# Patient Record
Sex: FEMALE | Race: WHITE | NOT HISPANIC OR LATINO
[De-identification: names, ages, dates, MRNs, and addresses within clinical notes are randomized per-mention and may not be internally consistent; named-entity substitution may affect disease eponyms.]

---

## 2021-12-07 ENCOUNTER — OFFICE VISIT (OUTPATIENT)
Age: 51
End: 2021-12-07

## 2021-12-20 ENCOUNTER — OFFICE VISIT (OUTPATIENT)
Dept: URBAN - METROPOLITAN AREA CLINIC 7 | Facility: CLINIC | Age: 51
End: 2021-12-20

## 2022-01-05 ENCOUNTER — TELEPHONE ENCOUNTER (OUTPATIENT)
Dept: URBAN - METROPOLITAN AREA CLINIC 9 | Facility: CLINIC | Age: 52
End: 2022-01-05

## 2022-01-07 ENCOUNTER — LAB OUTSIDE AN ENCOUNTER (OUTPATIENT)
Age: 52
End: 2022-01-07

## 2022-01-08 LAB
CULTURE: (no result)
EIA: (no result)

## 2022-01-12 LAB — CONCENTRATE (1): (no result)

## 2022-01-13 ENCOUNTER — TELEPHONE ENCOUNTER (OUTPATIENT)
Dept: URBAN - METROPOLITAN AREA CLINIC 9 | Facility: CLINIC | Age: 52
End: 2022-01-13

## 2022-01-13 LAB
CALPROTECTIN, STOOL: (no result)
CAMPYLOBACTER SPP. AG,EIA: (no result)
CLOSTRIDIUM DIFFICILE TOXINB,QL REAL TIME PCR: NOT DETECTED
CYCLOSPORA AND ISOSPORA EXAMINATION: (no result)
OVA AND PARASITES, CONC AND PERM SMEAR: (no result)
PANCREATIC ELASTASE-1: (no result)
SALMONELLA AND SHIGELLA, CULTURE: (no result)
SHIGA TOXINS, EIA W/RFL TO E.COLI O157 CULTURE: (no result)

## 2022-01-14 ENCOUNTER — TELEPHONE ENCOUNTER (OUTPATIENT)
Dept: URBAN - METROPOLITAN AREA CLINIC 9 | Facility: CLINIC | Age: 52
End: 2022-01-14

## 2022-01-21 ENCOUNTER — TELEPHONE ENCOUNTER (OUTPATIENT)
Dept: URBAN - METROPOLITAN AREA CLINIC 9 | Facility: CLINIC | Age: 52
End: 2022-01-21

## 2022-02-16 ENCOUNTER — TELEPHONE ENCOUNTER (OUTPATIENT)
Dept: URBAN - METROPOLITAN AREA CLINIC 9 | Facility: CLINIC | Age: 52
End: 2022-02-16

## 2022-02-17 ENCOUNTER — OFFICE VISIT (OUTPATIENT)
Dept: URBAN - METROPOLITAN AREA SURGERY CENTER 5 | Facility: SURGERY CENTER | Age: 52
End: 2022-02-17

## 2022-02-23 ENCOUNTER — OFFICE VISIT (OUTPATIENT)
Dept: URBAN - METROPOLITAN AREA SURGERY CENTER 5 | Facility: SURGERY CENTER | Age: 52
End: 2022-02-23

## 2022-04-07 ENCOUNTER — TELEPHONE ENCOUNTER (OUTPATIENT)
Dept: URBAN - METROPOLITAN AREA CLINIC 9 | Facility: CLINIC | Age: 52
End: 2022-04-07

## 2022-04-12 ENCOUNTER — OFFICE VISIT (OUTPATIENT)
Dept: URBAN - METROPOLITAN AREA SURGERY CENTER 5 | Facility: SURGERY CENTER | Age: 52
End: 2022-04-12

## 2022-04-13 ENCOUNTER — OFFICE VISIT (OUTPATIENT)
Dept: URBAN - METROPOLITAN AREA SURGERY CENTER 5 | Facility: SURGERY CENTER | Age: 52
End: 2022-04-13

## 2022-04-13 ENCOUNTER — TELEPHONE ENCOUNTER (OUTPATIENT)
Dept: URBAN - METROPOLITAN AREA CLINIC 9 | Facility: CLINIC | Age: 52
End: 2022-04-13

## 2022-07-09 ENCOUNTER — TELEPHONE ENCOUNTER (OUTPATIENT)
Dept: URBAN - METROPOLITAN AREA CLINIC 121 | Facility: CLINIC | Age: 52
End: 2022-07-09

## 2022-07-09 RX ORDER — MIRTAZAPINE 30 MG/1
TABLET, FILM COATED ORAL ONCE A DAY
Refills: 0 | OUTPATIENT
Start: 2017-03-14 | End: 2017-05-19

## 2022-07-09 RX ORDER — PYRIDOSTIGMINE BROMIDE 60 MG/1
1/2 TAB BID TABLET ORAL
Refills: 0 | OUTPATIENT
Start: 2016-09-07 | End: 2016-11-16

## 2022-07-09 RX ORDER — TRAZODONE HYDROCHLORIDE 100 MG/1
TABLET ORAL
Refills: 0 | OUTPATIENT
Start: 2015-11-19 | End: 2016-09-07

## 2022-07-09 RX ORDER — ATENOLOL 50 MG/1
TABLET ORAL
Refills: 0 | OUTPATIENT
Start: 2015-11-19 | End: 2016-09-07

## 2022-07-09 RX ORDER — FLUDROCORTISONE ACETATE 0.1 MG/1
TABLET ORAL
Refills: 0 | OUTPATIENT
Start: 2017-03-14 | End: 2017-05-19

## 2022-07-09 RX ORDER — BUTALBITAL, ACETAMINOPHEN, CAFFEINE, AND CODEINE PHOSPHATE 50; 300; 40; 30 MG/1; MG/1; MG/1; MG/1
CAPSULE ORAL ONCE A DAY
Refills: 0 | OUTPATIENT
Start: 2017-10-18 | End: 2017-11-22

## 2022-07-09 RX ORDER — CITALOPRAM 40 MG/1
TABLET ORAL
Refills: 0 | OUTPATIENT
Start: 2017-10-18 | End: 2017-11-22

## 2022-07-09 RX ORDER — CYCLOBENZAPRINE HYDROCHLORIDE 5 MG/1
TABLET, FILM COATED ORAL ONCE A DAY
Refills: 0 | OUTPATIENT
Start: 2017-05-19 | End: 2017-05-19

## 2022-07-09 RX ORDER — DICYCLOMINE HYDROCHLORIDE 20 MG/1
TAKE 1 TABLET BY MOUTH FOUR TIMES DAILY TABLET ORAL
Refills: 0 | OUTPATIENT
Start: 2017-05-15 | End: 2017-09-06

## 2022-07-09 RX ORDER — CYCLOBENZAPRINE HYDROCHLORIDE 5 MG/1
TABLET, FILM COATED ORAL ONCE A DAY
Refills: 0 | OUTPATIENT
Start: 2016-09-07 | End: 2016-11-16

## 2022-07-09 RX ORDER — CITALOPRAM 40 MG/1
TABLET ORAL
Refills: 0 | OUTPATIENT
Start: 2017-03-14 | End: 2017-05-19

## 2022-07-09 RX ORDER — MIRTAZAPINE 30 MG/1
TABLET, FILM COATED ORAL ONCE A DAY
Refills: 0 | OUTPATIENT
Start: 2016-12-07 | End: 2017-03-14

## 2022-07-09 RX ORDER — OXYCODONE AND ACETAMINOPHEN 5; 325 MG/1; MG/1
TABLET ORAL
Refills: 0 | OUTPATIENT
Start: 2015-11-19 | End: 2016-09-07

## 2022-07-09 RX ORDER — CITALOPRAM 40 MG/1
TABLET ORAL
Refills: 0 | OUTPATIENT
Start: 2016-12-07 | End: 2017-03-14

## 2022-07-09 RX ORDER — FLUDROCORTISONE ACETATE 0.1 MG/1
TABLET ORAL TWICE A DAY
Refills: 0 | OUTPATIENT
Start: 2017-10-18 | End: 2017-11-22

## 2022-07-09 RX ORDER — OXYCODONE AND ACETAMINOPHEN 5; 325 MG/1; MG/1
TABLET ORAL THREE TIMES A DAY
Refills: 0 | OUTPATIENT
Start: 2017-03-14 | End: 2017-05-19

## 2022-07-09 RX ORDER — RIFAXIMIN 550 MG/1
TAKE 1 TABLET BY MOUTH THREE TIMES DAILY TABLET ORAL
Refills: 1 | OUTPATIENT
Start: 2017-04-10 | End: 2018-02-05

## 2022-07-09 RX ORDER — CYCLOBENZAPRINE HYDROCHLORIDE 5 MG/1
TABLET, FILM COATED ORAL
Refills: 0 | OUTPATIENT
Start: 2015-09-15 | End: 2015-11-19

## 2022-07-09 RX ORDER — ATENOLOL 50 MG/1
TABLET ORAL TWICE A DAY
Refills: 0 | OUTPATIENT
Start: 2016-09-07 | End: 2016-11-16

## 2022-07-09 RX ORDER — LAMOTRIGINE 100 MG/1
TABLET ORAL TWICE A DAY
Refills: 0 | OUTPATIENT
Start: 2016-11-16 | End: 2016-12-07

## 2022-07-09 RX ORDER — CYCLOBENZAPRINE HYDROCHLORIDE 5 MG/1
TABLET, FILM COATED ORAL AS NEEDED
Refills: 0 | OUTPATIENT
Start: 2017-10-18 | End: 2017-11-22

## 2022-07-09 RX ORDER — FLUDROCORTISONE ACETATE 0.1 MG/1
TABLET ORAL TWICE A DAY
Refills: 0 | OUTPATIENT
Start: 2017-08-11 | End: 2017-10-18

## 2022-07-09 RX ORDER — BUTALBITAL, ACETAMINOPHEN, CAFFEINE, AND CODEINE PHOSPHATE 50; 300; 40; 30 MG/1; MG/1; MG/1; MG/1
CAPSULE ORAL ONCE A DAY
Refills: 0 | OUTPATIENT
Start: 2017-03-14 | End: 2017-05-19

## 2022-07-09 RX ORDER — OXYCODONE AND ACETAMINOPHEN 5; 325 MG/1; MG/1
TABLET ORAL THREE TIMES A DAY
Refills: 0 | OUTPATIENT
Start: 2016-11-16 | End: 2016-12-07

## 2022-07-09 RX ORDER — OXYCODONE AND ACETAMINOPHEN 5; 325 MG/1; MG/1
TABLET ORAL ONCE A DAY
Refills: 0 | OUTPATIENT
Start: 2016-09-07 | End: 2016-09-07

## 2022-07-09 RX ORDER — EZETIMIBE 10 MG/1
TABLET ORAL ONCE A DAY
Refills: 0 | OUTPATIENT
Start: 2017-10-18 | End: 2017-11-22

## 2022-07-09 RX ORDER — CYCLOBENZAPRINE HYDROCHLORIDE 5 MG/1
TABLET, FILM COATED ORAL ONCE A DAY
Refills: 0 | OUTPATIENT
Start: 2017-03-14 | End: 2017-05-19

## 2022-07-09 RX ORDER — CITALOPRAM 40 MG/1
TABLET ORAL
Refills: 0 | OUTPATIENT
Start: 2017-11-22 | End: 2018-02-05

## 2022-07-09 RX ORDER — MIRTAZAPINE 30 MG/1
TABLET, FILM COATED ORAL ONCE A DAY
Refills: 0 | OUTPATIENT
Start: 2017-05-19 | End: 2017-08-11

## 2022-07-09 RX ORDER — FLUDROCORTISONE ACETATE 0.1 MG/1
TABLET ORAL
Refills: 0 | OUTPATIENT
Start: 2017-05-19 | End: 2017-08-11

## 2022-07-09 RX ORDER — PYRIDOSTIGMINE BROMIDE 60 MG/1
TABLET ORAL THREE TIMES A DAY
Refills: 0 | OUTPATIENT
Start: 2017-03-14 | End: 2017-05-19

## 2022-07-09 RX ORDER — PANTOPRAZOLE SODIUM 40 MG/1
TWICE A DAY TABLET, DELAYED RELEASE ORAL TWICE A DAY
Refills: 3 | OUTPATIENT
Start: 2016-11-16 | End: 2017-08-11

## 2022-07-09 RX ORDER — RIFAXIMIN 550 MG/1
THREE TIMES A DAY TABLET ORAL THREE TIMES A DAY
Refills: 0 | OUTPATIENT
Start: 2017-08-15 | End: 2017-10-18

## 2022-07-09 RX ORDER — CYCLOBENZAPRINE HYDROCHLORIDE 5 MG/1
TABLET, FILM COATED ORAL
Refills: 0 | OUTPATIENT
Start: 2015-11-19 | End: 2016-09-07

## 2022-07-09 RX ORDER — ATENOLOL 50 MG/1
TABLET ORAL TWICE A DAY
Refills: 0 | OUTPATIENT
Start: 2017-11-22 | End: 2018-02-05

## 2022-07-09 RX ORDER — FLUDROCORTISONE ACETATE 0.1 MG/1
TABLET ORAL TWICE A DAY
Refills: 0 | OUTPATIENT
Start: 2017-11-22 | End: 2018-02-05

## 2022-07-09 RX ORDER — BUTALBITAL, ACETAMINOPHEN, CAFFEINE, AND CODEINE PHOSPHATE 50; 300; 40; 30 MG/1; MG/1; MG/1; MG/1
CAPSULE ORAL ONCE A DAY
Refills: 0 | OUTPATIENT
Start: 2017-05-19 | End: 2017-08-11

## 2022-07-09 RX ORDER — MIRTAZAPINE 30 MG/1
TABLET, FILM COATED ORAL
Refills: 0 | OUTPATIENT
Start: 2015-09-15 | End: 2015-11-19

## 2022-07-09 RX ORDER — TRAZODONE HYDROCHLORIDE 100 MG/1
TABLET ORAL
Refills: 0 | OUTPATIENT
Start: 2017-08-11 | End: 2017-10-18

## 2022-07-09 RX ORDER — ATENOLOL 50 MG/1
TABLET ORAL
Refills: 0 | OUTPATIENT
Start: 2015-09-15 | End: 2015-11-19

## 2022-07-09 RX ORDER — TRAZODONE HYDROCHLORIDE 100 MG/1
TABLET ORAL
Refills: 0 | OUTPATIENT
Start: 2017-05-19 | End: 2017-08-11

## 2022-07-09 RX ORDER — BUTALBITAL, ACETAMINOPHEN, CAFFEINE, AND CODEINE PHOSPHATE 50; 300; 40; 30 MG/1; MG/1; MG/1; MG/1
CAPSULE ORAL ONCE A DAY
Refills: 0 | OUTPATIENT
Start: 2016-12-07 | End: 2017-03-14

## 2022-07-09 RX ORDER — DICYCLOMINE HYDROCHLORIDE 20 MG/1
TAKE 1 TABLET BY MOUTH FOUR TIMES DAILY TABLET ORAL
Refills: 0 | OUTPATIENT
Start: 2017-09-06 | End: 2017-10-09

## 2022-07-09 RX ORDER — FLUDROCORTISONE ACETATE 0.1 MG/1
TABLET ORAL
Refills: 0 | OUTPATIENT
Start: 2016-12-07 | End: 2017-03-14

## 2022-07-09 RX ORDER — METOPROLOL SUCCINATE 25 MG/1
TABLET, EXTENDED RELEASE ORAL TWICE A DAY
Refills: 0 | OUTPATIENT
Start: 2017-10-18 | End: 2017-11-22

## 2022-07-09 RX ORDER — TRAZODONE HYDROCHLORIDE 100 MG/1
TABLET ORAL ONCE A DAY
Refills: 0 | OUTPATIENT
Start: 2016-11-16 | End: 2016-12-07

## 2022-07-09 RX ORDER — ATENOLOL 50 MG/1
TABLET ORAL TWICE A DAY
Refills: 0 | OUTPATIENT
Start: 2016-11-16 | End: 2016-12-07

## 2022-07-09 RX ORDER — LAMOTRIGINE 100 MG/1
TABLET ORAL
Refills: 0 | OUTPATIENT
Start: 2015-09-15 | End: 2015-11-19

## 2022-07-09 RX ORDER — CYCLOBENZAPRINE HYDROCHLORIDE 5 MG/1
TABLET, FILM COATED ORAL ONCE A DAY
Refills: 0 | OUTPATIENT
Start: 2016-11-16 | End: 2016-12-07

## 2022-07-09 RX ORDER — ATENOLOL 50 MG/1
TABLET ORAL TWICE A DAY
Refills: 0 | OUTPATIENT
Start: 2016-12-07 | End: 2017-03-14

## 2022-07-09 RX ORDER — PYRIDOSTIGMINE BROMIDE 60 MG/1
1/2 TAB BID TABLET ORAL
Refills: 0 | OUTPATIENT
Start: 2016-12-07 | End: 2017-03-14

## 2022-07-09 RX ORDER — EZETIMIBE 10 MG/1
TABLET ORAL ONCE A DAY
Refills: 0 | OUTPATIENT
Start: 2017-11-22 | End: 2018-02-05

## 2022-07-09 RX ORDER — MIRTAZAPINE 30 MG/1
TABLET, FILM COATED ORAL ONCE A DAY
Refills: 0 | OUTPATIENT
Start: 2017-10-18 | End: 2017-11-22

## 2022-07-09 RX ORDER — OXYCODONE AND ACETAMINOPHEN 5; 325 MG/1; MG/1
TABLET ORAL
Refills: 0 | OUTPATIENT
Start: 2015-09-15 | End: 2015-11-19

## 2022-07-09 RX ORDER — BUTALBITAL, ACETAMINOPHEN, CAFFEINE, AND CODEINE PHOSPHATE 50; 300; 40; 30 MG/1; MG/1; MG/1; MG/1
CAPSULE ORAL
Refills: 0 | OUTPATIENT
Start: 2015-11-19 | End: 2016-09-07

## 2022-07-09 RX ORDER — LAMOTRIGINE 100 MG/1
TABLET ORAL TWICE A DAY
Refills: 0 | OUTPATIENT
Start: 2017-10-18 | End: 2017-11-22

## 2022-07-09 RX ORDER — FLUDROCORTISONE ACETATE 0.1 MG/1
TABLET ORAL
Refills: 0 | OUTPATIENT
Start: 2016-09-07 | End: 2016-11-16

## 2022-07-09 RX ORDER — BUTALBITAL, ACETAMINOPHEN, CAFFEINE, AND CODEINE PHOSPHATE 50; 300; 40; 30 MG/1; MG/1; MG/1; MG/1
CAPSULE ORAL ONCE A DAY
Refills: 0 | OUTPATIENT
Start: 2017-11-22 | End: 2018-02-05

## 2022-07-09 RX ORDER — MIRTAZAPINE 30 MG/1
TABLET, FILM COATED ORAL ONCE A DAY
Refills: 0 | OUTPATIENT
Start: 2016-11-16 | End: 2016-12-07

## 2022-07-09 RX ORDER — OXYCODONE AND ACETAMINOPHEN 5; 325 MG/1; MG/1
TABLET ORAL THREE TIMES A DAY
Refills: 0 | OUTPATIENT
Start: 2017-08-11 | End: 2017-10-18

## 2022-07-09 RX ORDER — METOPROLOL SUCCINATE 25 MG/1
TABLET, EXTENDED RELEASE ORAL TWICE A DAY
Refills: 0 | OUTPATIENT
Start: 2017-05-19 | End: 2017-08-11

## 2022-07-09 RX ORDER — ATENOLOL 50 MG/1
TABLET ORAL TWICE A DAY
Refills: 0 | OUTPATIENT
Start: 2017-05-19 | End: 2017-08-11

## 2022-07-09 RX ORDER — LAMOTRIGINE 100 MG/1
TABLET ORAL TWICE A DAY
Refills: 0 | OUTPATIENT
Start: 2017-08-11 | End: 2017-10-18

## 2022-07-09 RX ORDER — MIRTAZAPINE 30 MG/1
TABLET, FILM COATED ORAL ONCE A DAY
Refills: 0 | OUTPATIENT
Start: 2017-08-11 | End: 2017-10-18

## 2022-07-09 RX ORDER — METOPROLOL SUCCINATE 25 MG/1
TABLET, EXTENDED RELEASE ORAL TWICE A DAY
Refills: 0 | OUTPATIENT
Start: 2017-11-22 | End: 2018-02-05

## 2022-07-09 RX ORDER — LAMOTRIGINE 100 MG/1
TABLET ORAL
Refills: 0 | OUTPATIENT
Start: 2015-11-19 | End: 2016-09-07

## 2022-07-09 RX ORDER — CITALOPRAM 40 MG/1
TABLET ORAL
Refills: 0 | OUTPATIENT
Start: 2016-11-16 | End: 2016-12-07

## 2022-07-09 RX ORDER — PYRIDOSTIGMINE BROMIDE 60 MG/1
TABLET ORAL THREE TIMES A DAY
Refills: 0 | OUTPATIENT
Start: 2017-08-11 | End: 2017-10-18

## 2022-07-09 RX ORDER — TRAZODONE HYDROCHLORIDE 100 MG/1
TABLET ORAL ONCE A DAY
Refills: 0 | OUTPATIENT
Start: 2016-12-07 | End: 2017-03-14

## 2022-07-09 RX ORDER — TRAZODONE HYDROCHLORIDE 100 MG/1
TABLET ORAL
Refills: 0 | OUTPATIENT
Start: 2015-09-15 | End: 2015-11-19

## 2022-07-09 RX ORDER — CITALOPRAM 40 MG/1
TABLET ORAL
Refills: 0 | OUTPATIENT
Start: 2015-11-19 | End: 2016-09-07

## 2022-07-09 RX ORDER — FLUDROCORTISONE ACETATE 0.1 MG/1
TABLET ORAL
Refills: 0 | OUTPATIENT
Start: 2016-11-16 | End: 2016-12-07

## 2022-07-09 RX ORDER — LAMOTRIGINE 100 MG/1
TABLET ORAL TWICE A DAY
Refills: 0 | OUTPATIENT
Start: 2016-09-07 | End: 2016-11-16

## 2022-07-09 RX ORDER — LAMOTRIGINE 100 MG/1
TABLET ORAL TWICE A DAY
Refills: 0 | OUTPATIENT
Start: 2017-05-19 | End: 2017-08-11

## 2022-07-09 RX ORDER — CITALOPRAM 40 MG/1
TABLET ORAL
Refills: 0 | OUTPATIENT
Start: 2017-05-19 | End: 2017-08-11

## 2022-07-09 RX ORDER — TRAZODONE HYDROCHLORIDE 100 MG/1
TABLET ORAL ONCE A DAY
Refills: 0 | OUTPATIENT
Start: 2016-09-07 | End: 2016-11-16

## 2022-07-09 RX ORDER — CITALOPRAM 40 MG/1
TABLET ORAL
Refills: 0 | OUTPATIENT
Start: 2016-09-07 | End: 2016-11-16

## 2022-07-09 RX ORDER — PYRIDOSTIGMINE BROMIDE 60 MG/1
TABLET ORAL THREE TIMES A DAY
Refills: 0 | OUTPATIENT
Start: 2017-10-18 | End: 2017-11-22

## 2022-07-09 RX ORDER — MIRTAZAPINE 30 MG/1
TABLET, FILM COATED ORAL
Refills: 0 | OUTPATIENT
Start: 2015-11-19 | End: 2016-09-07

## 2022-07-09 RX ORDER — OXYCODONE AND ACETAMINOPHEN 5; 325 MG/1; MG/1
TABLET ORAL THREE TIMES A DAY
Refills: 0 | OUTPATIENT
Start: 2016-09-07 | End: 2016-11-16

## 2022-07-09 RX ORDER — PYRIDOSTIGMINE BROMIDE 60 MG/1
TABLET ORAL THREE TIMES A DAY
Refills: 0 | OUTPATIENT
Start: 2017-05-19 | End: 2017-08-11

## 2022-07-09 RX ORDER — LAMOTRIGINE 100 MG/1
TABLET ORAL TWICE A DAY
Refills: 0 | OUTPATIENT
Start: 2017-11-22 | End: 2018-02-05

## 2022-07-09 RX ORDER — PYRIDOSTIGMINE BROMIDE 60 MG/1
TABLET ORAL THREE TIMES A DAY
Refills: 0 | OUTPATIENT
Start: 2017-11-22 | End: 2018-02-05

## 2022-07-09 RX ORDER — METOPROLOL SUCCINATE 25 MG/1
TABLET, EXTENDED RELEASE ORAL TWICE A DAY
Refills: 0 | OUTPATIENT
Start: 2017-08-11 | End: 2017-10-18

## 2022-07-09 RX ORDER — TRAZODONE HYDROCHLORIDE 100 MG/1
TABLET ORAL
Refills: 0 | OUTPATIENT
Start: 2017-10-18 | End: 2017-11-22

## 2022-07-09 RX ORDER — PANTOPRAZOLE SODIUM 40 MG/1
ONCE A DAY TABLET, DELAYED RELEASE ORAL ONCE A DAY
Refills: 11 | OUTPATIENT
Start: 2016-09-07 | End: 2017-03-14

## 2022-07-09 RX ORDER — LAMOTRIGINE 100 MG/1
TABLET ORAL TWICE A DAY
Refills: 0 | OUTPATIENT
Start: 2016-12-07 | End: 2017-03-14

## 2022-07-09 RX ORDER — OXYCODONE AND ACETAMINOPHEN 5; 325 MG/1; MG/1
TABLET ORAL THREE TIMES A DAY
Refills: 0 | OUTPATIENT
Start: 2017-05-19 | End: 2017-08-11

## 2022-07-09 RX ORDER — CYCLOBENZAPRINE HYDROCHLORIDE 5 MG/1
TABLET, FILM COATED ORAL AS NEEDED
Refills: 0 | OUTPATIENT
Start: 2017-05-19 | End: 2017-08-11

## 2022-07-09 RX ORDER — TRAZODONE HYDROCHLORIDE 100 MG/1
TABLET ORAL
Refills: 0 | OUTPATIENT
Start: 2017-11-22 | End: 2018-02-05

## 2022-07-09 RX ORDER — BUTALBITAL, ACETAMINOPHEN, CAFFEINE, AND CODEINE PHOSPHATE 50; 300; 40; 30 MG/1; MG/1; MG/1; MG/1
CAPSULE ORAL ONCE A DAY
Refills: 0 | OUTPATIENT
Start: 2016-11-16 | End: 2016-12-07

## 2022-07-09 RX ORDER — DEXLANSOPRAZOLE 30 MG/1
ONCE A DAY CAPSULE, DELAYED RELEASE ORAL ONCE A DAY
Refills: 3 | OUTPATIENT
Start: 2017-03-14 | End: 2017-05-19

## 2022-07-09 RX ORDER — CYCLOBENZAPRINE HYDROCHLORIDE 5 MG/1
TABLET, FILM COATED ORAL ONCE A DAY
Refills: 0 | OUTPATIENT
Start: 2016-12-07 | End: 2017-03-14

## 2022-07-09 RX ORDER — ATENOLOL 50 MG/1
TABLET ORAL TWICE A DAY
Refills: 0 | OUTPATIENT
Start: 2017-03-14 | End: 2017-05-19

## 2022-07-09 RX ORDER — ATENOLOL 50 MG/1
TABLET ORAL TWICE A DAY
Refills: 0 | OUTPATIENT
Start: 2017-10-18 | End: 2017-11-22

## 2022-07-09 RX ORDER — LAMOTRIGINE 100 MG/1
TABLET ORAL TWICE A DAY
Refills: 0 | OUTPATIENT
Start: 2017-03-14 | End: 2017-05-19

## 2022-07-09 RX ORDER — CYCLOBENZAPRINE HYDROCHLORIDE 5 MG/1
TABLET, FILM COATED ORAL AS NEEDED
Refills: 0 | OUTPATIENT
Start: 2017-08-11 | End: 2017-10-18

## 2022-07-09 RX ORDER — DICYCLOMINE HYDROCHLORIDE 20 MG/1
FOUR TIMES A DAY TABLET ORAL
Refills: 1 | OUTPATIENT
Start: 2016-12-07 | End: 2017-05-15

## 2022-07-09 RX ORDER — BUTALBITAL, ACETAMINOPHEN, CAFFEINE, AND CODEINE PHOSPHATE 50; 300; 40; 30 MG/1; MG/1; MG/1; MG/1
CAPSULE ORAL
Refills: 0 | OUTPATIENT
Start: 2015-09-15 | End: 2015-11-19

## 2022-07-09 RX ORDER — RIFAXIMIN 550 MG/1
THREE TIMES A DAY TABLET ORAL THREE TIMES A DAY
Refills: 0 | OUTPATIENT
Start: 2016-11-16 | End: 2017-03-14

## 2022-07-09 RX ORDER — PYRIDOSTIGMINE BROMIDE 60 MG/1
1/2 TAB BID TABLET ORAL
Refills: 0 | OUTPATIENT
Start: 2016-11-16 | End: 2016-12-07

## 2022-07-09 RX ORDER — BUTALBITAL, ACETAMINOPHEN, CAFFEINE, AND CODEINE PHOSPHATE 50; 300; 40; 30 MG/1; MG/1; MG/1; MG/1
CAPSULE ORAL ONCE A DAY
Refills: 0 | OUTPATIENT
Start: 2016-09-07 | End: 2016-11-16

## 2022-07-09 RX ORDER — ATENOLOL 50 MG/1
TABLET ORAL TWICE A DAY
Refills: 0 | OUTPATIENT
Start: 2017-08-11 | End: 2017-10-18

## 2022-07-09 RX ORDER — MIRTAZAPINE 30 MG/1
TABLET, FILM COATED ORAL ONCE A DAY
Refills: 0 | OUTPATIENT
Start: 2017-11-22 | End: 2018-02-05

## 2022-07-09 RX ORDER — BUTALBITAL, ACETAMINOPHEN, CAFFEINE, AND CODEINE PHOSPHATE 50; 300; 40; 30 MG/1; MG/1; MG/1; MG/1
CAPSULE ORAL ONCE A DAY
Refills: 0 | OUTPATIENT
Start: 2017-08-11 | End: 2017-10-18

## 2022-07-09 RX ORDER — CITALOPRAM 40 MG/1
TABLET ORAL
Refills: 0 | OUTPATIENT
Start: 2015-09-15 | End: 2015-11-19

## 2022-07-09 RX ORDER — TRAZODONE HYDROCHLORIDE 100 MG/1
TABLET ORAL
Refills: 0 | OUTPATIENT
Start: 2017-03-14 | End: 2017-05-19

## 2022-07-09 RX ORDER — CITALOPRAM 40 MG/1
TABLET ORAL
Refills: 0 | OUTPATIENT
Start: 2017-08-11 | End: 2017-10-18

## 2022-07-09 RX ORDER — CYCLOBENZAPRINE HYDROCHLORIDE 5 MG/1
TABLET, FILM COATED ORAL AS NEEDED
Refills: 0 | OUTPATIENT
Start: 2017-11-22 | End: 2018-02-05

## 2022-07-09 RX ORDER — MIRTAZAPINE 30 MG/1
TABLET, FILM COATED ORAL ONCE A DAY
Refills: 0 | OUTPATIENT
Start: 2016-09-07 | End: 2016-11-16

## 2022-07-09 RX ORDER — OXYCODONE AND ACETAMINOPHEN 5; 325 MG/1; MG/1
TABLET ORAL THREE TIMES A DAY
Refills: 0 | OUTPATIENT
Start: 2016-12-07 | End: 2017-03-14

## 2022-07-10 ENCOUNTER — TELEPHONE ENCOUNTER (OUTPATIENT)
Dept: URBAN - METROPOLITAN AREA CLINIC 121 | Facility: CLINIC | Age: 52
End: 2022-07-10

## 2022-07-10 RX ORDER — LAMOTRIGINE 100 MG/1
TABLET ORAL TWICE A DAY
Refills: 0 | Status: ACTIVE | COMMUNITY
Start: 2018-02-05

## 2022-07-10 RX ORDER — EZETIMIBE 10 MG/1
TABLET ORAL ONCE A DAY
Refills: 0 | Status: ACTIVE | COMMUNITY
Start: 2018-02-05

## 2022-07-10 RX ORDER — ALPRAZOLAM 0.25 MG
TABLET ORAL AS NEEDED
Refills: 0 | Status: ACTIVE | COMMUNITY
Start: 2018-02-05

## 2022-07-10 RX ORDER — PANTOPRAZOLE SODIUM 40 MG/1
TWICE A DAY TABLET, DELAYED RELEASE ORAL TWICE A DAY
Refills: 3 | Status: ACTIVE | COMMUNITY
Start: 2017-10-09

## 2022-07-10 RX ORDER — FLUDROCORTISONE ACETATE 0.1 MG/1
TABLET ORAL TWICE A DAY
Refills: 0 | Status: ACTIVE | COMMUNITY
Start: 2018-02-05

## 2022-07-10 RX ORDER — SODIUM BICARBONATE 650 MG/1
2 ONCE A DAY TABLET ORAL
Refills: 11 | Status: ACTIVE | COMMUNITY
Start: 2017-04-11

## 2022-07-10 RX ORDER — ELUXADOLINE 100 MG/1
TWICE A DAY TABLET, FILM COATED ORAL TWICE A DAY
Refills: 3 | Status: ACTIVE | COMMUNITY
Start: 2017-10-18

## 2022-07-10 RX ORDER — DICYCLOMINE HYDROCHLORIDE 20 MG/1
FOUR TIMES A DAY AS NEEDED TABLET ORAL
Refills: 3 | Status: ACTIVE | COMMUNITY
Start: 2017-10-09

## 2022-07-10 RX ORDER — BUTALBITAL, ACETAMINOPHEN, CAFFEINE, AND CODEINE PHOSPHATE 50; 300; 40; 30 MG/1; MG/1; MG/1; MG/1
CAPSULE ORAL ONCE A DAY
Refills: 0 | Status: ACTIVE | COMMUNITY
Start: 2018-02-05

## 2022-07-10 RX ORDER — MIRTAZAPINE 30 MG/1
TABLET, FILM COATED ORAL ONCE A DAY
Refills: 0 | Status: ACTIVE | COMMUNITY
Start: 2018-02-05

## 2022-07-10 RX ORDER — PYRIDOSTIGMINE BROMIDE 60 MG/1
TABLET ORAL THREE TIMES A DAY
Refills: 0 | Status: ACTIVE | COMMUNITY
Start: 2018-02-05

## 2022-07-10 RX ORDER — ATENOLOL 50 MG/1
TABLET ORAL TWICE A DAY
Refills: 0 | Status: ACTIVE | COMMUNITY
Start: 2018-02-05

## 2022-07-10 RX ORDER — CITALOPRAM 40 MG/1
TABLET ORAL
Refills: 0 | Status: ACTIVE | COMMUNITY
Start: 2018-02-05

## 2022-07-10 RX ORDER — TRAZODONE HYDROCHLORIDE 100 MG/1
TABLET ORAL
Refills: 0 | Status: ACTIVE | COMMUNITY
Start: 2018-02-05

## 2022-07-10 RX ORDER — CYCLOBENZAPRINE HYDROCHLORIDE 5 MG/1
TABLET, FILM COATED ORAL AS NEEDED
Refills: 0 | Status: ACTIVE | COMMUNITY
Start: 2018-02-05

## 2022-07-30 ENCOUNTER — TELEPHONE ENCOUNTER (OUTPATIENT)
Age: 52
End: 2022-07-30

## 2022-07-30 RX ORDER — DICYCLOMINE HYDROCHLORIDE 20 MG/1
(ONE HALF)-1 TABLET ORAL
Qty: 0 | Refills: 2 | OUTPATIENT
Start: 2021-12-20 | End: 2022-01-19

## 2022-07-31 ENCOUNTER — TELEPHONE ENCOUNTER (OUTPATIENT)
Age: 52
End: 2022-07-31

## 2022-07-31 RX ORDER — DICYCLOMINE HYDROCHLORIDE 20 MG/1
(ONE HALF)-1 TABLET ORAL
Qty: 0 | Refills: 2 | Status: ACTIVE | COMMUNITY
Start: 2021-12-20

## 2022-11-22 ENCOUNTER — P2P PATIENT RECORD (OUTPATIENT)
Age: 52
End: 2022-11-22

## 2022-11-22 ENCOUNTER — TELEPHONE ENCOUNTER (OUTPATIENT)
Dept: URBAN - METROPOLITAN AREA CLINIC 63 | Facility: CLINIC | Age: 52
End: 2022-11-22

## 2022-12-14 ENCOUNTER — OFFICE VISIT (OUTPATIENT)
Dept: URBAN - METROPOLITAN AREA CLINIC 60 | Facility: CLINIC | Age: 52
End: 2022-12-14

## 2022-12-14 RX ORDER — EZETIMIBE 10 MG/1
TABLET ORAL ONCE A DAY
Refills: 0 | COMMUNITY
Start: 2018-02-05

## 2022-12-14 RX ORDER — ATENOLOL 50 MG/1
TABLET ORAL TWICE A DAY
Refills: 0 | COMMUNITY
Start: 2018-02-05

## 2022-12-14 RX ORDER — LAMOTRIGINE 100 MG/1
TABLET ORAL TWICE A DAY
Refills: 0 | COMMUNITY
Start: 2018-02-05

## 2022-12-14 RX ORDER — MIRTAZAPINE 30 MG/1
TABLET, FILM COATED ORAL ONCE A DAY
Refills: 0 | COMMUNITY
Start: 2018-02-05

## 2022-12-14 RX ORDER — FLUDROCORTISONE ACETATE 0.1 MG/1
TABLET ORAL TWICE A DAY
Refills: 0 | COMMUNITY
Start: 2018-02-05

## 2022-12-14 RX ORDER — SODIUM BICARBONATE 650 MG/1
2 ONCE A DAY TABLET ORAL
Refills: 11 | COMMUNITY
Start: 2017-04-11

## 2022-12-14 RX ORDER — ELUXADOLINE 100 MG/1
TWICE A DAY TABLET, FILM COATED ORAL TWICE A DAY
Refills: 3 | COMMUNITY
Start: 2017-10-18

## 2022-12-14 RX ORDER — CITALOPRAM 40 MG/1
TABLET ORAL
Refills: 0 | COMMUNITY
Start: 2018-02-05

## 2022-12-14 RX ORDER — CYCLOBENZAPRINE HYDROCHLORIDE 5 MG/1
TABLET, FILM COATED ORAL AS NEEDED
Refills: 0 | COMMUNITY
Start: 2018-02-05

## 2022-12-14 RX ORDER — BUTALBITAL, ACETAMINOPHEN, CAFFEINE, AND CODEINE PHOSPHATE 50; 300; 40; 30 MG/1; MG/1; MG/1; MG/1
CAPSULE ORAL ONCE A DAY
Refills: 0 | COMMUNITY
Start: 2018-02-05

## 2022-12-14 RX ORDER — DICYCLOMINE HYDROCHLORIDE 20 MG/1
(ONE HALF)-1 TABLET ORAL
Qty: 0 | Refills: 2 | COMMUNITY
Start: 2021-12-20

## 2022-12-14 RX ORDER — ALPRAZOLAM 0.25 MG
TABLET ORAL AS NEEDED
Refills: 0 | COMMUNITY
Start: 2018-02-05

## 2022-12-14 RX ORDER — PYRIDOSTIGMINE BROMIDE 60 MG/1
TABLET ORAL THREE TIMES A DAY
Refills: 0 | COMMUNITY
Start: 2018-02-05

## 2022-12-14 RX ORDER — PANTOPRAZOLE SODIUM 40 MG/1
TWICE A DAY TABLET, DELAYED RELEASE ORAL TWICE A DAY
Refills: 3 | COMMUNITY
Start: 2017-10-09

## 2022-12-14 RX ORDER — TRAZODONE HYDROCHLORIDE 100 MG/1
TABLET ORAL
Refills: 0 | COMMUNITY
Start: 2018-02-05

## 2022-12-19 ENCOUNTER — OFFICE VISIT (OUTPATIENT)
Dept: URBAN - METROPOLITAN AREA CLINIC 63 | Facility: CLINIC | Age: 52
End: 2022-12-19
Payer: COMMERCIAL

## 2022-12-19 ENCOUNTER — WEB ENCOUNTER (OUTPATIENT)
Dept: URBAN - METROPOLITAN AREA CLINIC 63 | Facility: CLINIC | Age: 52
End: 2022-12-19

## 2022-12-19 ENCOUNTER — LAB OUTSIDE AN ENCOUNTER (OUTPATIENT)
Dept: URBAN - METROPOLITAN AREA CLINIC 63 | Facility: CLINIC | Age: 52
End: 2022-12-19

## 2022-12-19 VITALS
SYSTOLIC BLOOD PRESSURE: 122 MMHG | TEMPERATURE: 96.9 F | HEART RATE: 72 BPM | WEIGHT: 184.4 LBS | BODY MASS INDEX: 29.63 KG/M2 | HEIGHT: 66 IN | DIASTOLIC BLOOD PRESSURE: 80 MMHG | OXYGEN SATURATION: 96 %

## 2022-12-19 DIAGNOSIS — E78.1 HYPERTRIGLYCERIDEMIA: ICD-10-CM

## 2022-12-19 DIAGNOSIS — J44.9 COPD (CHRONIC OBSTRUCTIVE PULMONARY DISEASE) WITH CHRONIC BRONCHITIS: ICD-10-CM

## 2022-12-19 DIAGNOSIS — R10.13 EPIGASTRIC ABDOMINAL PAIN: ICD-10-CM

## 2022-12-19 DIAGNOSIS — R11.2 NAUSEA AND VOMITING, UNSPECIFIED VOMITING TYPE: ICD-10-CM

## 2022-12-19 DIAGNOSIS — K85.90 ACUTE PANCREATITIS, UNSPECIFIED COMPLICATION STATUS, UNSPECIFIED PANCREATITIS TYPE: ICD-10-CM

## 2022-12-19 PROBLEM — 13645005 CHRONIC OBSTRUCTIVE PULMONARY DISEASE: Status: ACTIVE | Noted: 2022-12-19

## 2022-12-19 PROBLEM — 302870006: Status: ACTIVE | Noted: 2022-12-19

## 2022-12-19 PROCEDURE — 99204 OFFICE O/P NEW MOD 45 MIN: CPT | Performed by: NURSE PRACTITIONER

## 2022-12-19 RX ORDER — LAMOTRIGINE 100 MG/1
TABLET ORAL TWICE A DAY
Refills: 0 | Status: ACTIVE | COMMUNITY
Start: 2018-02-05

## 2022-12-19 RX ORDER — PANTOPRAZOLE SODIUM 40 MG/1
TWICE A DAY TABLET, DELAYED RELEASE ORAL TWICE A DAY
Refills: 3 | Status: ACTIVE | COMMUNITY
Start: 2017-10-09

## 2022-12-19 RX ORDER — CYCLOBENZAPRINE HYDROCHLORIDE 5 MG/1
TABLET, FILM COATED ORAL AS NEEDED
Refills: 0 | Status: ACTIVE | COMMUNITY
Start: 2018-02-05

## 2022-12-19 RX ORDER — ATENOLOL 50 MG/1
1 TABLET TABLET ORAL TWICE A DAY
Refills: 0 | Status: ACTIVE | COMMUNITY
Start: 2018-02-05

## 2022-12-19 RX ORDER — DICYCLOMINE HYDROCHLORIDE 20 MG/1
(ONE HALF)-1 TABLET ORAL
Qty: 0 | Refills: 2 | Status: ACTIVE | COMMUNITY
Start: 2021-12-20

## 2022-12-19 RX ORDER — ALPRAZOLAM 0.25 MG
TABLET ORAL AS NEEDED
Refills: 0 | Status: ACTIVE | COMMUNITY
Start: 2018-02-05

## 2022-12-19 RX ORDER — TRAZODONE HYDROCHLORIDE 100 MG/1
TABLET ORAL
Refills: 0 | Status: ACTIVE | COMMUNITY
Start: 2018-02-05

## 2022-12-19 RX ORDER — FLUDROCORTISONE ACETATE 0.1 MG/1
TABLET ORAL TWICE A DAY
Refills: 0 | Status: ACTIVE | COMMUNITY
Start: 2018-02-05

## 2022-12-19 RX ORDER — PYRIDOSTIGMINE BROMIDE 60 MG/1
TABLET ORAL THREE TIMES A DAY
Refills: 0 | Status: ACTIVE | COMMUNITY
Start: 2018-02-05

## 2022-12-19 RX ORDER — ONDANSETRON 8 MG/1
1 TABLET ON THE TONGUE AND ALLOW TO DISSOLVE  AS NEEDED TABLET, ORALLY DISINTEGRATING ORAL THREE TIMES A DAY
Qty: 45 | Refills: 1 | OUTPATIENT
Start: 2022-12-19

## 2022-12-19 RX ORDER — SODIUM BICARBONATE 650 MG/1
2 ONCE A DAY TABLET ORAL
Refills: 11 | Status: ACTIVE | COMMUNITY
Start: 2017-04-11

## 2022-12-19 NOTE — HPI-HOSPITAL FOLLOW-UP
Here for hospital follow up. Seen at [facility] er then [facility]. No records available. CT at walk-in showed pancreatitis. Kept in hospital for 4 days Dc'd with nothing for nausea or pain. Now having pain in epigastrium, sometimes to her back. Having nausea most days but not everytime she eats. Vomitting most of the time that she gets nauseous. Zofran odt somewhat helpful, sometimes prevents the vomitting. No oily stools, always has diarrhea. Has a hx of IBS-D last seen in 2018 by me, has seen Ilia Lindsey in the past few years. She quit smoking 13 years ago, doesn't drink alcohol. Denies any nicotine use

## 2022-12-19 NOTE — PHYSICAL EXAM CHEST:
no lesions,  no deformities,  no traumatic injuries,  no significant scars are present,  chest wall non-tender,  no masses present, breathing is unlabored without accessory muscle use, decreased breath sounds

## 2022-12-19 NOTE — PHYSICAL EXAM GASTROINTESTINAL
Abdomen , soft, diffusely tender worse in epigastrium, nondistended , displays guarding but no rigidity , no masses palpable , normal bowel sounds , Liver and Spleen , no hepatomegaly present , no hepatosplenomegaly , liver nontender , spleen not palpable, No Ascites, No hernia

## 2022-12-29 LAB
A/G RATIO: 1.5
ALBUMIN: 3.9
ALKALINE PHOSPHATASE: 119
ALT (SGPT): 7
AST (SGOT): 8
BILIRUBIN, TOTAL: 0.2
BUN/CREATININE RATIO: 27
BUN: 12
CALCIUM: 9.4
CARBON DIOXIDE, TOTAL: 26
CHLORIDE: 103
CREATININE: 0.45
EGFR: 114
GLOBULIN, TOTAL: 2.6
GLUCOSE: 114
HEMATOCRIT: 42.9
HEMOGLOBIN: 14.6
IGG, SUBCLASS 4: 22.1
LIPASE: 29
MCH: 29.3
MCHC: 34
MCV: 86
MPV: 10.5
PLATELET COUNT: 336
POTASSIUM: 4.7
PROTEIN, TOTAL: 6.5
RDW: 14
RED BLOOD CELL COUNT: 4.99
SODIUM: 140
TRIGLYCERIDES: 439
WHITE BLOOD CELL COUNT: 12.5

## 2023-01-11 ENCOUNTER — DASHBOARD ENCOUNTERS (OUTPATIENT)
Age: 53
End: 2023-01-11

## 2023-01-12 ENCOUNTER — TELEPHONE ENCOUNTER (OUTPATIENT)
Dept: URBAN - METROPOLITAN AREA CLINIC 63 | Facility: CLINIC | Age: 53
End: 2023-01-12

## 2023-01-12 ENCOUNTER — OFFICE VISIT (OUTPATIENT)
Dept: URBAN - METROPOLITAN AREA CLINIC 60 | Facility: CLINIC | Age: 53
End: 2023-01-12

## 2023-01-12 RX ORDER — LAMOTRIGINE 100 MG/1
TABLET ORAL TWICE A DAY
Refills: 0 | COMMUNITY
Start: 2018-02-05

## 2023-01-12 RX ORDER — PYRIDOSTIGMINE BROMIDE 60 MG/1
TABLET ORAL THREE TIMES A DAY
Refills: 0 | COMMUNITY
Start: 2018-02-05

## 2023-01-12 RX ORDER — CYCLOBENZAPRINE HYDROCHLORIDE 5 MG/1
TABLET, FILM COATED ORAL AS NEEDED
Refills: 0 | COMMUNITY
Start: 2018-02-05

## 2023-01-12 RX ORDER — TRAZODONE HYDROCHLORIDE 100 MG/1
TABLET ORAL
Refills: 0 | COMMUNITY
Start: 2018-02-05

## 2023-01-12 RX ORDER — SODIUM BICARBONATE 650 MG/1
2 ONCE A DAY TABLET ORAL
Refills: 11 | COMMUNITY
Start: 2017-04-11

## 2023-01-12 RX ORDER — FLUDROCORTISONE ACETATE 0.1 MG/1
TABLET ORAL TWICE A DAY
Refills: 0 | COMMUNITY
Start: 2018-02-05

## 2023-01-12 RX ORDER — ATENOLOL 50 MG/1
1 TABLET TABLET ORAL TWICE A DAY
Refills: 0 | COMMUNITY
Start: 2018-02-05

## 2023-01-12 RX ORDER — PANTOPRAZOLE SODIUM 40 MG/1
TWICE A DAY TABLET, DELAYED RELEASE ORAL TWICE A DAY
Refills: 3 | COMMUNITY
Start: 2017-10-09

## 2023-01-12 RX ORDER — ALPRAZOLAM 0.25 MG
TABLET ORAL AS NEEDED
Refills: 0 | COMMUNITY
Start: 2018-02-05

## 2023-01-12 RX ORDER — DICYCLOMINE HYDROCHLORIDE 20 MG/1
(ONE HALF)-1 TABLET ORAL
Qty: 0 | Refills: 2 | COMMUNITY
Start: 2021-12-20

## 2023-01-12 RX ORDER — ONDANSETRON 8 MG/1
1 TABLET ON THE TONGUE AND ALLOW TO DISSOLVE  AS NEEDED TABLET, ORALLY DISINTEGRATING ORAL THREE TIMES A DAY
Qty: 45 | Refills: 1 | COMMUNITY
Start: 2022-12-19